# Patient Record
Sex: MALE | Race: WHITE | HISPANIC OR LATINO | URBAN - METROPOLITAN AREA
[De-identification: names, ages, dates, MRNs, and addresses within clinical notes are randomized per-mention and may not be internally consistent; named-entity substitution may affect disease eponyms.]

---

## 2017-06-17 ENCOUNTER — EMERGENCY (EMERGENCY)
Facility: HOSPITAL | Age: 37
LOS: 0 days | Discharge: HOME | End: 2017-06-17
Attending: EMERGENCY MEDICINE | Admitting: EMERGENCY MEDICINE
Payer: COMMERCIAL

## 2017-06-17 VITALS
HEART RATE: 66 BPM | TEMPERATURE: 98 F | OXYGEN SATURATION: 96 % | DIASTOLIC BLOOD PRESSURE: 94 MMHG | SYSTOLIC BLOOD PRESSURE: 130 MMHG | RESPIRATION RATE: 19 BRPM

## 2017-06-17 VITALS — WEIGHT: 160.06 LBS | HEIGHT: 65 IN

## 2017-06-17 DIAGNOSIS — R06.02 SHORTNESS OF BREATH: ICD-10-CM

## 2017-06-17 DIAGNOSIS — J45.909 UNSPECIFIED ASTHMA, UNCOMPLICATED: ICD-10-CM

## 2017-06-17 LAB
ALBUMIN SERPL ELPH-MCNC: 4.3 G/DL — SIGNIFICANT CHANGE UP (ref 3.3–5)
ALP SERPL-CCNC: 102 U/L — SIGNIFICANT CHANGE UP (ref 40–120)
ALT FLD-CCNC: 31 U/L — SIGNIFICANT CHANGE UP (ref 12–78)
ANION GAP SERPL CALC-SCNC: 6 MMOL/L — SIGNIFICANT CHANGE UP (ref 5–17)
APTT BLD: 31.9 SEC — SIGNIFICANT CHANGE UP (ref 27.5–37.4)
AST SERPL-CCNC: 25 U/L — SIGNIFICANT CHANGE UP (ref 15–37)
BASOPHILS # BLD AUTO: 0.1 K/UL — SIGNIFICANT CHANGE UP (ref 0–0.2)
BASOPHILS NFR BLD AUTO: 0.6 % — SIGNIFICANT CHANGE UP (ref 0–2)
BILIRUB SERPL-MCNC: 0.4 MG/DL — SIGNIFICANT CHANGE UP (ref 0.2–1.2)
BUN SERPL-MCNC: 15 MG/DL — SIGNIFICANT CHANGE UP (ref 7–23)
CALCIUM SERPL-MCNC: 9.8 MG/DL — SIGNIFICANT CHANGE UP (ref 8.5–10.1)
CHLORIDE SERPL-SCNC: 106 MMOL/L — SIGNIFICANT CHANGE UP (ref 96–108)
CO2 SERPL-SCNC: 28 MMOL/L — SIGNIFICANT CHANGE UP (ref 22–31)
CREAT SERPL-MCNC: 0.96 MG/DL — SIGNIFICANT CHANGE UP (ref 0.5–1.3)
D DIMER BLD IA.RAPID-MCNC: 190 NG/ML DDU — SIGNIFICANT CHANGE UP
EOSINOPHIL # BLD AUTO: 0.4 K/UL — SIGNIFICANT CHANGE UP (ref 0–0.5)
EOSINOPHIL NFR BLD AUTO: 3.2 % — SIGNIFICANT CHANGE UP (ref 0–6)
GLUCOSE SERPL-MCNC: 97 MG/DL — SIGNIFICANT CHANGE UP (ref 70–99)
HCT VFR BLD CALC: 48.2 % — SIGNIFICANT CHANGE UP (ref 39–50)
HGB BLD-MCNC: 16.3 G/DL — SIGNIFICANT CHANGE UP (ref 13–17)
INR BLD: 1.03 RATIO — SIGNIFICANT CHANGE UP (ref 0.88–1.16)
LYMPHOCYTES # BLD AUTO: 1.4 K/UL — SIGNIFICANT CHANGE UP (ref 1–3.3)
LYMPHOCYTES # BLD AUTO: 11.3 % — LOW (ref 13–44)
MAGNESIUM SERPL-MCNC: 2.2 MG/DL — SIGNIFICANT CHANGE UP (ref 1.6–2.6)
MCHC RBC-ENTMCNC: 29.3 PG — SIGNIFICANT CHANGE UP (ref 27–34)
MCHC RBC-ENTMCNC: 33.8 GM/DL — SIGNIFICANT CHANGE UP (ref 32–36)
MCV RBC AUTO: 86.6 FL — SIGNIFICANT CHANGE UP (ref 80–100)
MONOCYTES # BLD AUTO: 0.6 K/UL — SIGNIFICANT CHANGE UP (ref 0–0.9)
MONOCYTES NFR BLD AUTO: 5.1 % — SIGNIFICANT CHANGE UP (ref 2–14)
NEUTROPHILS # BLD AUTO: 9.9 K/UL — HIGH (ref 1.8–7.4)
NEUTROPHILS NFR BLD AUTO: 79.8 % — HIGH (ref 43–77)
NT-PROBNP SERPL-SCNC: 16 PG/ML — SIGNIFICANT CHANGE UP (ref 0–125)
PLATELET # BLD AUTO: 234 K/UL — SIGNIFICANT CHANGE UP (ref 150–400)
POTASSIUM SERPL-MCNC: 4.3 MMOL/L — SIGNIFICANT CHANGE UP (ref 3.5–5.3)
POTASSIUM SERPL-SCNC: 4.3 MMOL/L — SIGNIFICANT CHANGE UP (ref 3.5–5.3)
PROT SERPL-MCNC: 8 GM/DL — SIGNIFICANT CHANGE UP (ref 6–8.3)
PROTHROM AB SERPL-ACNC: 11.1 SEC — SIGNIFICANT CHANGE UP (ref 9.8–12.7)
RBC # BLD: 5.57 M/UL — SIGNIFICANT CHANGE UP (ref 4.2–5.8)
RBC # FLD: 11.6 % — SIGNIFICANT CHANGE UP (ref 10.3–14.5)
SODIUM SERPL-SCNC: 140 MMOL/L — SIGNIFICANT CHANGE UP (ref 135–145)
TROPONIN I SERPL-MCNC: <0.015 NG/ML — SIGNIFICANT CHANGE UP (ref 0.01–0.04)
TSH SERPL-MCNC: 3.8 UU/ML — HIGH (ref 0.36–3.74)
WBC # BLD: 12.4 K/UL — HIGH (ref 3.8–10.5)
WBC # FLD AUTO: 12.4 K/UL — HIGH (ref 3.8–10.5)

## 2017-06-17 PROCEDURE — 93010 ELECTROCARDIOGRAM REPORT: CPT

## 2017-06-17 PROCEDURE — 71020: CPT | Mod: 26

## 2017-06-17 PROCEDURE — 99285 EMERGENCY DEPT VISIT HI MDM: CPT

## 2017-06-17 RX ORDER — SODIUM CHLORIDE 9 MG/ML
1000 INJECTION INTRAMUSCULAR; INTRAVENOUS; SUBCUTANEOUS ONCE
Qty: 0 | Refills: 0 | Status: COMPLETED | OUTPATIENT
Start: 2017-06-17 | End: 2017-06-17

## 2017-06-17 RX ORDER — SODIUM CHLORIDE 9 MG/ML
3 INJECTION INTRAMUSCULAR; INTRAVENOUS; SUBCUTANEOUS ONCE
Qty: 0 | Refills: 0 | Status: COMPLETED | OUTPATIENT
Start: 2017-06-17 | End: 2017-06-17

## 2017-06-17 RX ORDER — ALBUTEROL 90 UG/1
1 AEROSOL, METERED ORAL ONCE
Qty: 0 | Refills: 0 | Status: DISCONTINUED | OUTPATIENT
Start: 2017-06-17 | End: 2017-06-17

## 2017-06-17 RX ORDER — ALBUTEROL 90 UG/1
2 AEROSOL, METERED ORAL
Qty: 1 | Refills: 0 | OUTPATIENT
Start: 2017-06-17

## 2017-06-17 RX ORDER — IPRATROPIUM/ALBUTEROL SULFATE 18-103MCG
3 AEROSOL WITH ADAPTER (GRAM) INHALATION ONCE
Qty: 0 | Refills: 0 | Status: COMPLETED | OUTPATIENT
Start: 2017-06-17 | End: 2017-06-17

## 2017-06-17 RX ADMIN — Medication 125 MILLIGRAM(S): at 09:30

## 2017-06-17 RX ADMIN — SODIUM CHLORIDE 3 MILLILITER(S): 9 INJECTION INTRAMUSCULAR; INTRAVENOUS; SUBCUTANEOUS at 09:30

## 2017-06-17 RX ADMIN — SODIUM CHLORIDE 1000 MILLILITER(S): 9 INJECTION INTRAMUSCULAR; INTRAVENOUS; SUBCUTANEOUS at 09:30

## 2017-06-17 RX ADMIN — Medication 3 MILLILITER(S): at 09:30

## 2017-06-17 NOTE — ED PROVIDER NOTE - CHPI ED SYMPTOMS NEG
no hemoptysis/no diaphoresis/no chills/no body aches/no cough/no fever/no headache/no edema/no chest pain/no wheezing

## 2017-06-17 NOTE — ED PROVIDER NOTE - PROGRESS NOTE DETAILS
pt sob improved s/p nebs   pt advised to better use mask while working   Return to the ER immediately for any worsening symptoms, concerns, chest pain, fevers, shortness of breath, vomiting, abdominal pain, rashes, neck pain, back pain, numbness, paresthesias, pain or any difficulties at all.  Please follow up with your own private physician or our medical clinic at [phone] in the next 2-3 days.  Find a doctor at [phone].  Copies of your tests were provided to you for follow-up.  You must address all your findings with your doctor.

## 2018-03-12 ENCOUNTER — APPOINTMENT (OUTPATIENT)
Dept: OPHTHALMOLOGY | Facility: CLINIC | Age: 38
End: 2018-03-12
Payer: COMMERCIAL

## 2018-03-12 PROCEDURE — 92002 INTRM OPH EXAM NEW PATIENT: CPT

## 2018-03-19 ENCOUNTER — APPOINTMENT (OUTPATIENT)
Dept: OPHTHALMOLOGY | Facility: CLINIC | Age: 38
End: 2018-03-19
Payer: COMMERCIAL

## 2018-03-19 PROCEDURE — 92012 INTRM OPH EXAM EST PATIENT: CPT

## 2018-03-22 ENCOUNTER — RX RENEWAL (OUTPATIENT)
Age: 38
End: 2018-03-22

## 2018-04-02 ENCOUNTER — APPOINTMENT (OUTPATIENT)
Dept: OPHTHALMOLOGY | Facility: CLINIC | Age: 38
End: 2018-04-02
Payer: COMMERCIAL

## 2018-04-02 PROCEDURE — 92014 COMPRE OPH EXAM EST PT 1/>: CPT

## 2018-06-18 ENCOUNTER — APPOINTMENT (OUTPATIENT)
Dept: RADIOLOGY | Facility: CLINIC | Age: 38
End: 2018-06-18
Payer: COMMERCIAL

## 2018-06-18 ENCOUNTER — OUTPATIENT (OUTPATIENT)
Dept: OUTPATIENT SERVICES | Facility: HOSPITAL | Age: 38
LOS: 1 days | End: 2018-06-18
Payer: COMMERCIAL

## 2018-06-18 DIAGNOSIS — Z00.8 ENCOUNTER FOR OTHER GENERAL EXAMINATION: ICD-10-CM

## 2018-06-18 PROCEDURE — 71046 X-RAY EXAM CHEST 2 VIEWS: CPT

## 2018-06-18 PROCEDURE — 71046 X-RAY EXAM CHEST 2 VIEWS: CPT | Mod: 26

## 2018-08-02 ENCOUNTER — APPOINTMENT (OUTPATIENT)
Dept: INTERNAL MEDICINE | Facility: CLINIC | Age: 38
End: 2018-08-02

## 2018-08-08 ENCOUNTER — EMERGENCY (EMERGENCY)
Facility: HOSPITAL | Age: 38
LOS: 0 days | Discharge: HOME | End: 2018-08-08
Attending: EMERGENCY MEDICINE | Admitting: EMERGENCY MEDICINE
Payer: COMMERCIAL

## 2018-08-08 VITALS
HEIGHT: 65 IN | HEART RATE: 76 BPM | WEIGHT: 160.06 LBS | TEMPERATURE: 98 F | OXYGEN SATURATION: 100 % | SYSTOLIC BLOOD PRESSURE: 135 MMHG | DIASTOLIC BLOOD PRESSURE: 80 MMHG | RESPIRATION RATE: 18 BRPM

## 2018-08-08 DIAGNOSIS — R06.02 SHORTNESS OF BREATH: ICD-10-CM

## 2018-08-08 DIAGNOSIS — Z79.51 LONG TERM (CURRENT) USE OF INHALED STEROIDS: ICD-10-CM

## 2018-08-08 DIAGNOSIS — R53.1 WEAKNESS: ICD-10-CM

## 2018-08-08 LAB
ALBUMIN SERPL ELPH-MCNC: 4.2 G/DL — SIGNIFICANT CHANGE UP (ref 3.3–5)
ALP SERPL-CCNC: 95 U/L — SIGNIFICANT CHANGE UP (ref 40–120)
ALT FLD-CCNC: 30 U/L — SIGNIFICANT CHANGE UP (ref 12–78)
ANION GAP SERPL CALC-SCNC: 6 MMOL/L — SIGNIFICANT CHANGE UP (ref 5–17)
AST SERPL-CCNC: 19 U/L — SIGNIFICANT CHANGE UP (ref 15–37)
BASOPHILS # BLD AUTO: 0.04 K/UL — SIGNIFICANT CHANGE UP (ref 0–0.2)
BASOPHILS NFR BLD AUTO: 0.4 % — SIGNIFICANT CHANGE UP (ref 0–2)
BILIRUB SERPL-MCNC: 0.2 MG/DL — SIGNIFICANT CHANGE UP (ref 0.2–1.2)
BUN SERPL-MCNC: 16 MG/DL — SIGNIFICANT CHANGE UP (ref 7–23)
CALCIUM SERPL-MCNC: 9.3 MG/DL — SIGNIFICANT CHANGE UP (ref 8.5–10.1)
CHLORIDE SERPL-SCNC: 107 MMOL/L — SIGNIFICANT CHANGE UP (ref 96–108)
CO2 SERPL-SCNC: 27 MMOL/L — SIGNIFICANT CHANGE UP (ref 22–31)
CREAT SERPL-MCNC: 1.03 MG/DL — SIGNIFICANT CHANGE UP (ref 0.5–1.3)
EOSINOPHIL # BLD AUTO: 0.51 K/UL — HIGH (ref 0–0.5)
EOSINOPHIL NFR BLD AUTO: 5.5 % — SIGNIFICANT CHANGE UP (ref 0–6)
GLUCOSE SERPL-MCNC: 103 MG/DL — HIGH (ref 70–99)
HCT VFR BLD CALC: 44.5 % — SIGNIFICANT CHANGE UP (ref 39–50)
HGB BLD-MCNC: 15.3 G/DL — SIGNIFICANT CHANGE UP (ref 13–17)
IMM GRANULOCYTES NFR BLD AUTO: 0.8 % — SIGNIFICANT CHANGE UP (ref 0–1.5)
LYMPHOCYTES # BLD AUTO: 1.38 K/UL — SIGNIFICANT CHANGE UP (ref 1–3.3)
LYMPHOCYTES # BLD AUTO: 15 % — SIGNIFICANT CHANGE UP (ref 13–44)
MCHC RBC-ENTMCNC: 29.8 PG — SIGNIFICANT CHANGE UP (ref 27–34)
MCHC RBC-ENTMCNC: 34.4 GM/DL — SIGNIFICANT CHANGE UP (ref 32–36)
MCV RBC AUTO: 86.6 FL — SIGNIFICANT CHANGE UP (ref 80–100)
MONOCYTES # BLD AUTO: 0.66 K/UL — SIGNIFICANT CHANGE UP (ref 0–0.9)
MONOCYTES NFR BLD AUTO: 7.2 % — SIGNIFICANT CHANGE UP (ref 2–14)
NEUTROPHILS # BLD AUTO: 6.56 K/UL — SIGNIFICANT CHANGE UP (ref 1.8–7.4)
NEUTROPHILS NFR BLD AUTO: 71.1 % — SIGNIFICANT CHANGE UP (ref 43–77)
NRBC # BLD: 0 /100 WBCS — SIGNIFICANT CHANGE UP (ref 0–0)
NT-PROBNP SERPL-SCNC: 20 PG/ML — SIGNIFICANT CHANGE UP (ref 0–125)
PLATELET # BLD AUTO: 250 K/UL — SIGNIFICANT CHANGE UP (ref 150–400)
POTASSIUM SERPL-MCNC: 4.3 MMOL/L — SIGNIFICANT CHANGE UP (ref 3.5–5.3)
POTASSIUM SERPL-SCNC: 4.3 MMOL/L — SIGNIFICANT CHANGE UP (ref 3.5–5.3)
PROT SERPL-MCNC: 7.7 GM/DL — SIGNIFICANT CHANGE UP (ref 6–8.3)
RBC # BLD: 5.14 M/UL — SIGNIFICANT CHANGE UP (ref 4.2–5.8)
RBC # FLD: 13 % — SIGNIFICANT CHANGE UP (ref 10.3–14.5)
SODIUM SERPL-SCNC: 140 MMOL/L — SIGNIFICANT CHANGE UP (ref 135–145)
TROPONIN I SERPL-MCNC: <0.015 NG/ML — SIGNIFICANT CHANGE UP (ref 0.01–0.04)
WBC # BLD: 9.22 K/UL — SIGNIFICANT CHANGE UP (ref 3.8–10.5)
WBC # FLD AUTO: 9.22 K/UL — SIGNIFICANT CHANGE UP (ref 3.8–10.5)

## 2018-08-08 PROCEDURE — 93010 ELECTROCARDIOGRAM REPORT: CPT

## 2018-08-08 PROCEDURE — 71275 CT ANGIOGRAPHY CHEST: CPT | Mod: 26

## 2018-08-08 PROCEDURE — 99285 EMERGENCY DEPT VISIT HI MDM: CPT

## 2018-08-08 RX ORDER — ALBUTEROL 90 UG/1
2.5 AEROSOL, METERED ORAL ONCE
Qty: 0 | Refills: 0 | Status: COMPLETED | OUTPATIENT
Start: 2018-08-08 | End: 2018-08-08

## 2018-08-08 RX ADMIN — ALBUTEROL 2.5 MILLIGRAM(S): 90 AEROSOL, METERED ORAL at 17:26

## 2018-08-08 NOTE — ED STATDOCS - NEUROLOGICAL, MLM
sensation is normal and strength is normal. sensation is normal and strength is normal. normal gait.

## 2018-08-08 NOTE — ED STATDOCS - PROGRESS NOTE DETAILS
37 yo M with no PMHx presents to ED c/o episodes of SOB last night and today, associated with dizziness and weakness in b/l legs.  Pt used albuterol inhaler last night with symptom relief, used again today w/o relief.  Pt has seen a pulmonologist in the past w/o significant findings.  Pt denies CP, fever/chills, numbness/tingling, recent travel, swelling, or other acute complaints at this time  PE: lungs CTA b/l, RRR s1/s2, radial pulses 2+ b/l, strength and sensation intact b/l UE and LE, EKG NSR at 65bpm  Plan: CT chest, labs, troponin, nebulizer, ativan   Tanna Mares PA-C CT angio chest negative, all labs WNL, troponin negative.  Pt feeling better after nebulizer, plan for d/c home with outpt f/u with pulmonologist.  Pt agreeable to d/c and plan of care, all questions answered, return precautions given.  Tanna Mares PA-C

## 2018-08-08 NOTE — ED STATDOCS - MEDICAL DECISION MAKING DETAILS
Pt presenting with chest tightness, SOB. Sometimes relieved with albuterol. Normal respiratory exam at this time. Will obtain labs, EKG, troponin, CT chest per family request. if normal, will dc.

## 2018-08-08 NOTE — ED ADULT NURSE NOTE - NSIMPLEMENTINTERV_GEN_ALL_ED
Implemented All Universal Safety Interventions:  [place] to call system. Call bell, personal items and telephone within reach. Instruct patient to call for assistance. Room bathroom lighting operational. Non-slip footwear when patient is off stretcher. Physically safe environment: no spills, clutter or unnecessary equipment. Stretcher in lowest position, wheels locked, appropriate side rails in place.

## 2018-08-08 NOTE — ED STATDOCS - OBJECTIVE STATEMENT
37 y/o male with no PMHx presents to the ED c/o episodes of SOB last night, today. +dizziness while driving today. +generalized weakness. +pressure to back with inspiration. Pt notes that he had to pull over upon experiencing sx. Used albuterol inhaler for sx last night with relief. Wife notes that he drank glass of wine yesterday. Works with epoxy coating for work with mask. Pt has seen pulmonologist with negative work-up in the past. Born in [facility], no FHx of TB. Denies pain, tingling, pedal edema, anxiety, recent travel. Nonsmoker. 39 y/o male with no PMHx presents to the ED c/o episodes of SOB last night, today. +dizziness while driving today. +generalized weakness. +pressure to back with inspiration. Pt notes that he had to pull over upon experiencing sx. Used albuterol inhaler for sx last night with relief. Wife notes that he drank glass of wine yesterday. Works with epoxy coating for work with mask. Pt has seen pulmonologist with negative work-up recently. Born in [facility], no FHx of TB. Denies pain, tingling, pedal edema, anxiety, recent travel. Nonsmoker. No drugs. No trouble swallowing.

## 2018-08-09 ENCOUNTER — APPOINTMENT (OUTPATIENT)
Dept: INTERNAL MEDICINE | Facility: CLINIC | Age: 38
End: 2018-08-09
Payer: COMMERCIAL

## 2018-08-09 VITALS
RESPIRATION RATE: 18 BRPM | BODY MASS INDEX: 26.66 KG/M2 | OXYGEN SATURATION: 98 % | WEIGHT: 160 LBS | HEIGHT: 65 IN | DIASTOLIC BLOOD PRESSURE: 58 MMHG | SYSTOLIC BLOOD PRESSURE: 100 MMHG | HEART RATE: 72 BPM | TEMPERATURE: 98.1 F

## 2018-08-09 DIAGNOSIS — Z78.9 OTHER SPECIFIED HEALTH STATUS: ICD-10-CM

## 2018-08-09 PROCEDURE — 94729 DIFFUSING CAPACITY: CPT

## 2018-08-09 PROCEDURE — ZZZZZ: CPT

## 2018-08-09 PROCEDURE — 99205 OFFICE O/P NEW HI 60 MIN: CPT | Mod: 25

## 2018-08-09 PROCEDURE — 94060 EVALUATION OF WHEEZING: CPT

## 2018-08-09 PROCEDURE — 94727 GAS DIL/WSHOT DETER LNG VOL: CPT

## 2018-08-09 RX ORDER — KETOROLAC TROMETHAMINE 4 MG/ML
0.4 SOLUTION/ DROPS OPHTHALMIC 4 TIMES DAILY
Qty: 5 | Refills: 2 | Status: DISCONTINUED | COMMUNITY
Start: 2018-03-19 | End: 2018-08-09

## 2018-08-09 RX ORDER — KETOROLAC TROMETHAMINE 4 MG/ML
0.4 SOLUTION/ DROPS OPHTHALMIC 4 TIMES DAILY
Qty: 5 | Refills: 1 | Status: DISCONTINUED | COMMUNITY
Start: 2018-03-22 | End: 2018-08-09

## 2018-08-09 RX ORDER — OFLOXACIN 3 MG/ML
0.3 SOLUTION/ DROPS OPHTHALMIC 4 TIMES DAILY
Qty: 5 | Refills: 1 | Status: DISCONTINUED | COMMUNITY
Start: 2018-03-12 | End: 2018-08-09

## 2018-08-09 RX ORDER — NEOMYCIN AND POLYMYXIN B SULFATES AND DEXAMETHASONE 3.5; 10000; 1 MG/G; [IU]/G; MG/G
3.5-10000-0.1 OINTMENT OPHTHALMIC
Qty: 1 | Refills: 1 | Status: DISCONTINUED | COMMUNITY
Start: 2018-04-02 | End: 2018-08-09

## 2018-08-09 NOTE — HISTORY OF PRESENT ILLNESS
[Spouse] : spouse [FreeTextEntry8] : Mr. Gomez presents for an acute evaluation accompanied by his wife. He is complaining of intermittent episodes of shortness of breath for the past 2 days. He feels as if he has a tightness in his chest. The patient went to [facility] emergency room yesterday. Cardiac enzymes were negative. Mr. Gomez had a CT angiogram of the chest which was negative for pulmonary embolism. There was no evidence of parenchymal lung disease. The patient denies any history of asthma or seasonal allergic rhinitis. He does have a history of trauma to his nose and has a deviated septum. Mr. Gomez had ENT evaluation by Dr. Maico Cruz. He has been started on steroid nasal spray. Mr. Gomez was seen by another pulmonologist and started on Symbicort 160/4.5 mcg 2 puffs b.i.d. as well as Ventolin inhaler for rescue therapy. Mr. Gomez states he felt worse using Symbicort and discontinued. He does get some relief from the shortness of breath and cough using albuterol.

## 2018-08-09 NOTE — ASSESSMENT
[FreeTextEntry1] : Mr. Gomez is a 38-year-old male is having symptoms of intermittent shortness of breath. Complete pulmonary function tests show no evidence of obstructive lung disease. Patient had a CT of the chest which showed no evidence of pulmonary embolism or interstitial lung disease. He does get some benefit from using albuterol. Patient also does have a deviated septum with that chronic rhinitis. He will be given a course of prednisone 20 mg p.o. b.i.d. x7 days as well as Singulair 10 mg daily. Mr. Gomez will continue followup with Dr. Cruz for ENT. Followup in this office in 4 weeks time to assess response to therapy.

## 2018-08-09 NOTE — DATA REVIEWED
[FreeTextEntry1] : Complete pulmonary function tests show normal spirometry, lung volumes and flow-volume loop. Diffusing capacity is normal.

## 2018-09-02 ENCOUNTER — EMERGENCY (EMERGENCY)
Facility: HOSPITAL | Age: 38
LOS: 1 days | Discharge: HOME | End: 2018-09-02
Attending: EMERGENCY MEDICINE
Payer: COMMERCIAL

## 2018-09-02 VITALS
RESPIRATION RATE: 18 BRPM | OXYGEN SATURATION: 100 % | HEIGHT: 65 IN | WEIGHT: 160.06 LBS | TEMPERATURE: 98 F | DIASTOLIC BLOOD PRESSURE: 60 MMHG | HEART RATE: 78 BPM | SYSTOLIC BLOOD PRESSURE: 96 MMHG

## 2018-09-02 PROCEDURE — 72131 CT LUMBAR SPINE W/O DYE: CPT

## 2018-09-02 PROCEDURE — 72131 CT LUMBAR SPINE W/O DYE: CPT | Mod: 26

## 2018-09-02 PROCEDURE — 99284 EMERGENCY DEPT VISIT MOD MDM: CPT

## 2018-09-02 PROCEDURE — 99284 EMERGENCY DEPT VISIT MOD MDM: CPT | Mod: 25

## 2018-09-02 PROCEDURE — 96372 THER/PROPH/DIAG INJ SC/IM: CPT

## 2018-09-02 RX ORDER — DIAZEPAM 5 MG
5 TABLET ORAL ONCE
Qty: 0 | Refills: 0 | Status: DISCONTINUED | OUTPATIENT
Start: 2018-09-02 | End: 2018-09-02

## 2018-09-02 RX ORDER — CYCLOBENZAPRINE HYDROCHLORIDE 10 MG/1
10 TABLET, FILM COATED ORAL ONCE
Qty: 0 | Refills: 0 | Status: COMPLETED | OUTPATIENT
Start: 2018-09-02 | End: 2018-09-02

## 2018-09-02 RX ORDER — METHOCARBAMOL 500 MG/1
2 TABLET, FILM COATED ORAL
Qty: 30 | Refills: 0 | OUTPATIENT
Start: 2018-09-02 | End: 2018-09-06

## 2018-09-02 RX ORDER — LIDOCAINE 4 G/100G
1 CREAM TOPICAL ONCE
Qty: 0 | Refills: 0 | Status: COMPLETED | OUTPATIENT
Start: 2018-09-02 | End: 2018-09-02

## 2018-09-02 RX ORDER — TRAMADOL HYDROCHLORIDE 50 MG/1
50 TABLET ORAL ONCE
Qty: 0 | Refills: 0 | Status: DISCONTINUED | OUTPATIENT
Start: 2018-09-02 | End: 2018-09-02

## 2018-09-02 RX ORDER — KETOROLAC TROMETHAMINE 30 MG/ML
60 SYRINGE (ML) INJECTION ONCE
Qty: 0 | Refills: 0 | Status: DISCONTINUED | OUTPATIENT
Start: 2018-09-02 | End: 2018-09-02

## 2018-09-02 RX ADMIN — LIDOCAINE 1 PATCH: 4 CREAM TOPICAL at 15:24

## 2018-09-02 RX ADMIN — Medication 60 MILLIGRAM(S): at 16:56

## 2018-09-02 RX ADMIN — CYCLOBENZAPRINE HYDROCHLORIDE 10 MILLIGRAM(S): 10 TABLET, FILM COATED ORAL at 15:25

## 2018-09-02 RX ADMIN — TRAMADOL HYDROCHLORIDE 50 MILLIGRAM(S): 50 TABLET ORAL at 15:24

## 2018-09-02 RX ADMIN — Medication 60 MILLIGRAM(S): at 16:54

## 2018-09-02 RX ADMIN — Medication 50 MILLIGRAM(S): at 15:24

## 2018-09-02 RX ADMIN — Medication 5 MILLIGRAM(S): at 16:54

## 2018-09-02 NOTE — ED ADULT TRIAGE NOTE - CHIEF COMPLAINT QUOTE
Pt presents to ED for eval of sudden onset of mid to lower back pain sustained while moving large pavers. Pt states that he felt a loud "crack" in his back. No radiation of pain. Pt took 1000 mgs of Naproxen. No decrease in sensation. Limited ROM secondary to pain.

## 2018-09-02 NOTE — ED ADULT NURSE NOTE - OBJECTIVE STATEMENT
pt arrived with lower back pain, 10/10 after lifting heavy pavers, pt states felt a pop, pt with swelling to lower back

## 2018-09-02 NOTE — ED ADULT NURSE NOTE - NSIMPLEMENTINTERV_GEN_ALL_ED
Implemented All Universal Safety Interventions:  [place] to call system. Call bell, personal items and telephone within reach. Instruct patient to call for assistance. Room bathroom lighting operational. Non-slip footwear when patient is off stretcher. Physically safe environment: no spills, clutter or unnecessary equipment. Stretcher in lowest position, wheels locked, appropriate side rails in place.

## 2018-09-02 NOTE — ED STATDOCS - MUSCULOSKELETAL, MLM
range of motion limited secondary to pain and has lumbar tenderness. LE muscle strength 5/5. No sedan anesthesia. range of motion limited secondary to pain and has lumbar tenderness. LE muscle strength 5/5. No saddle  paresthesia.

## 2018-09-02 NOTE — ED STATDOCS - OBJECTIVE STATEMENT
39 y/o M pt with no pertinent medical hx presents to ED c/o back pain. He states that his back pain travels from his mid back to the lower part of his back. He reports that he sustained this pain while moving large pavers (rocks). Pt states that he heard a loud "crack" in his back, and almost synopsized. Pt took 1000 mg of Naproxen. No further complaints at this time.

## 2018-09-02 NOTE — ED STATDOCS - ATTENDING CONTRIBUTION TO CARE
I, Deuce Khalil, performed the initial face to face bedside interview with this patient regarding history of present illness, review of symptoms and relevant past medical, social and family history.  I completed an independent physical examination.  I was the initial provider who evaluated this patient. I have signed out the follow up of any pending tests (i.e. labs, radiological studies) to the ACP.  I have communicated the patient’s plan of care and disposition with the ACP.

## 2018-09-07 ENCOUNTER — APPOINTMENT (OUTPATIENT)
Dept: INTERNAL MEDICINE | Facility: CLINIC | Age: 38
End: 2018-09-07
Payer: COMMERCIAL

## 2018-09-07 VITALS
RESPIRATION RATE: 18 BRPM | SYSTOLIC BLOOD PRESSURE: 100 MMHG | TEMPERATURE: 98.4 F | OXYGEN SATURATION: 98 % | HEART RATE: 64 BPM | BODY MASS INDEX: 26.66 KG/M2 | HEIGHT: 65 IN | WEIGHT: 160 LBS | DIASTOLIC BLOOD PRESSURE: 82 MMHG

## 2018-09-07 PROCEDURE — 99214 OFFICE O/P EST MOD 30 MIN: CPT

## 2019-04-11 ENCOUNTER — APPOINTMENT (OUTPATIENT)
Dept: RADIOLOGY | Facility: CLINIC | Age: 39
End: 2019-04-11
Payer: COMMERCIAL

## 2019-04-11 ENCOUNTER — OUTPATIENT (OUTPATIENT)
Dept: OUTPATIENT SERVICES | Facility: HOSPITAL | Age: 39
LOS: 1 days | End: 2019-04-11
Payer: COMMERCIAL

## 2019-04-11 DIAGNOSIS — Z00.8 ENCOUNTER FOR OTHER GENERAL EXAMINATION: ICD-10-CM

## 2019-04-11 PROCEDURE — 71046 X-RAY EXAM CHEST 2 VIEWS: CPT

## 2019-04-11 PROCEDURE — 71046 X-RAY EXAM CHEST 2 VIEWS: CPT | Mod: 26

## 2019-05-21 NOTE — ED PROVIDER NOTE - OBJECTIVE STATEMENT
Satisfactory
37 yo M with no prior medical hx who works with [employer] and exposed to dust and epoxid presents with sob. denies HA, cp, abdopain, n/v/d or dysuria. pt complains of dizziness.

## 2020-12-23 ENCOUNTER — NON-APPOINTMENT (OUTPATIENT)
Age: 40
End: 2020-12-23

## 2020-12-23 ENCOUNTER — APPOINTMENT (OUTPATIENT)
Dept: INTERNAL MEDICINE | Facility: CLINIC | Age: 40
End: 2020-12-23
Payer: COMMERCIAL

## 2020-12-23 VITALS
RESPIRATION RATE: 16 BRPM | HEART RATE: 70 BPM | BODY MASS INDEX: 26.33 KG/M2 | OXYGEN SATURATION: 98 % | TEMPERATURE: 98.3 F | DIASTOLIC BLOOD PRESSURE: 82 MMHG | SYSTOLIC BLOOD PRESSURE: 126 MMHG | WEIGHT: 158 LBS | HEIGHT: 65 IN

## 2020-12-23 DIAGNOSIS — H01.006 UNSPECIFIED BLEPHARITIS RIGHT EYE, UNSPECIFIED EYELID: ICD-10-CM

## 2020-12-23 DIAGNOSIS — Z87.898 PERSONAL HISTORY OF OTHER SPECIFIED CONDITIONS: ICD-10-CM

## 2020-12-23 DIAGNOSIS — Z77.098 CONTACT WITH AND (SUSPECTED) EXPOSURE TO OTHER HAZARDOUS, CHIEFLY NONMEDICINAL, CHEMICALS: ICD-10-CM

## 2020-12-23 DIAGNOSIS — Z87.09 PERSONAL HISTORY OF OTHER DISEASES OF THE RESPIRATORY SYSTEM: ICD-10-CM

## 2020-12-23 DIAGNOSIS — R05 COUGH: ICD-10-CM

## 2020-12-23 DIAGNOSIS — H01.003 UNSPECIFIED BLEPHARITIS RIGHT EYE, UNSPECIFIED EYELID: ICD-10-CM

## 2020-12-23 DIAGNOSIS — H15.101 UNSPECIFIED EPISCLERITIS, RIGHT EYE: ICD-10-CM

## 2020-12-23 DIAGNOSIS — H10.31 UNSPECIFIED ACUTE CONJUNCTIVITIS, RIGHT EYE: ICD-10-CM

## 2020-12-23 PROCEDURE — 99386 PREV VISIT NEW AGE 40-64: CPT | Mod: 25

## 2020-12-23 PROCEDURE — 99213 OFFICE O/P EST LOW 20 MIN: CPT | Mod: 25

## 2020-12-23 PROCEDURE — 93000 ELECTROCARDIOGRAM COMPLETE: CPT

## 2020-12-23 PROCEDURE — 96127 BRIEF EMOTIONAL/BEHAV ASSMT: CPT

## 2020-12-23 PROCEDURE — 99072 ADDL SUPL MATRL&STAF TM PHE: CPT

## 2020-12-23 RX ORDER — ALBUTEROL SULFATE 90 UG/1
108 (90 BASE) AEROSOL, METERED RESPIRATORY (INHALATION)
Refills: 0 | Status: DISCONTINUED | COMMUNITY
End: 2020-12-23

## 2020-12-23 RX ORDER — PREDNISONE 20 MG/1
20 TABLET ORAL TWICE DAILY
Qty: 14 | Refills: 1 | Status: DISCONTINUED | COMMUNITY
Start: 2018-08-09 | End: 2020-12-23

## 2020-12-23 RX ORDER — BUDESONIDE AND FORMOTEROL FUMARATE DIHYDRATE 160; 4.5 UG/1; UG/1
160-4.5 AEROSOL RESPIRATORY (INHALATION)
Refills: 0 | Status: DISCONTINUED | COMMUNITY
End: 2020-12-23

## 2020-12-23 RX ORDER — MONTELUKAST 10 MG/1
10 TABLET, FILM COATED ORAL
Qty: 30 | Refills: 5 | Status: DISCONTINUED | COMMUNITY
Start: 2018-08-09 | End: 2020-12-23

## 2020-12-27 NOTE — HISTORY OF PRESENT ILLNESS
[de-identified] : Here for CPE and to establish care\par \par He states he feels well\par \par He states he works with a lot of dusts at work which he inhales and would like to have a CXR done\par \par He states when he eats sweet things he does not feel well.  he states sugary foods give him a headache.  he wants to be checked for diabetes\par \par He reports he has a few years ago he lifted something heavy and has had some mild intermittent chronic low back pain since then.  he states this did not occur at work.  He states he was told in past he had a "disc problem".  HE denies leg weakness or paraesthesias.  NO weight loss.  NO urinary complaints.  He prefers to take no meds and is not taking anything for his sx

## 2020-12-27 NOTE — REVIEW OF SYSTEMS
[Negative] : Eyes [Fever] : no fever [Chills] : no chills [Fatigue] : no fatigue [Recent Change In Weight] : ~T no recent weight change [Earache] : no earache [Nasal Discharge] : no nasal discharge [Sore Throat] : no sore throat [Chest Pain] : no chest pain [Palpitations] : no palpitations [Lower Ext Edema] : no lower extremity edema [Shortness Of Breath] : no shortness of breath [Wheezing] : no wheezing [Cough] : no cough [Dyspnea on Exertion] : not dyspnea on exertion [Abdominal Pain] : no abdominal pain [Nausea] : no nausea [Diarrhea] : no diarrhea [Vomiting] : no vomiting [Skin Rash] : no skin rash [Anxiety] : no anxiety [Depression] : no depression [FreeTextEntry9] : see HPI

## 2020-12-27 NOTE — ASSESSMENT
[FreeTextEntry1] : \par \par Exposure to dusts at work:\par -no sx reported\par -will check CXR\par \par Low back pain:\par -he prefers no meds\par -will check xray of L-spine\par -[facility]\par \par Intolerance to sugary foods:\par -I advised he avoid foods high in sugar and foods that exacerbate sx\par -will check labs\par \par HCM:\par \par CPE 2020\par \par Depression screenin2020 PHQ 2 score 0\par \par EK2020 NSR at 73, IRBBB, no ST abnormalities \par \par Flu shot: advised 2020-he declined\par \par Tdap: advised-he states he will think about it-VIS given\par \par HIV testing: offered 2020-he declined\par \par Hepatitis C screening: offered \par \par PSA: ordered today\par \par F/U 6 months\par

## 2020-12-27 NOTE — PHYSICAL EXAM
[No Acute Distress] : no acute distress [Well Nourished] : well nourished [Well Developed] : well developed [Well-Appearing] : well-appearing [Normal Sclera/Conjunctiva] : normal sclera/conjunctiva [PERRL] : pupils equal round and reactive to light [EOMI] : extraocular movements intact [Normal Outer Ear/Nose] : the outer ears and nose were normal in appearance [Normal Oropharynx] : the oropharynx was normal [No JVD] : no jugular venous distention [No Lymphadenopathy] : no lymphadenopathy [Supple] : supple [Thyroid Normal, No Nodules] : the thyroid was normal and there were no nodules present [No Respiratory Distress] : no respiratory distress  [No Accessory Muscle Use] : no accessory muscle use [Clear to Auscultation] : lungs were clear to auscultation bilaterally [Normal Rate] : normal rate  [Regular Rhythm] : with a regular rhythm [Normal S1, S2] : normal S1 and S2 [No Murmur] : no murmur heard [No Carotid Bruits] : no carotid bruits [No Edema] : there was no peripheral edema [No Extremity Clubbing/Cyanosis] : no extremity clubbing/cyanosis [Soft] : abdomen soft [Non Tender] : non-tender [Non-distended] : non-distended [No Masses] : no abdominal mass palpated [No HSM] : no HSM [Normal Bowel Sounds] : normal bowel sounds [No CVA Tenderness] : no CVA  tenderness [No Spinal Tenderness] : no spinal tenderness [No Joint Swelling] : no joint swelling [Grossly Normal Strength/Tone] : grossly normal strength/tone [No Rash] : no rash [No Focal Deficits] : no focal deficits [Normal Affect] : the affect was normal [Normal Insight/Judgement] : insight and judgment were intact [Normal Voice/Communication] : normal voice/communication [Normal TMs] : both tympanic membranes were normal [Normal Nasal Mucosa] : the nasal mucosa was normal [No Skin Lesions] : no skin lesions [Alert and Oriented x3] : oriented to person, place, and time [Normal Mood] : the mood was normal [de-identified] : negative SLR B/L

## 2020-12-27 NOTE — HEALTH RISK ASSESSMENT
[No] : In the past 12 months have you used drugs other than those required for medical reasons? 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

## 2021-01-07 LAB
25(OH)D3 SERPL-MCNC: 24.1 NG/ML
ALBUMIN SERPL ELPH-MCNC: 4.7 G/DL
ALP BLD-CCNC: 95 U/L
ALT SERPL-CCNC: 18 U/L
ANION GAP SERPL CALC-SCNC: 11 MMOL/L
APPEARANCE: CLEAR
AST SERPL-CCNC: 19 U/L
BACTERIA: NEGATIVE
BASOPHILS # BLD AUTO: 0.06 K/UL
BASOPHILS NFR BLD AUTO: 0.8 %
BILIRUB SERPL-MCNC: 0.3 MG/DL
BILIRUBIN URINE: NEGATIVE
BLOOD URINE: NEGATIVE
BUN SERPL-MCNC: 14 MG/DL
CALCIUM SERPL-MCNC: 9.6 MG/DL
CHLORIDE SERPL-SCNC: 103 MMOL/L
CHOLEST SERPL-MCNC: 209 MG/DL
CO2 SERPL-SCNC: 27 MMOL/L
COLOR: NORMAL
CREAT SERPL-MCNC: 0.93 MG/DL
EOSINOPHIL # BLD AUTO: 0.86 K/UL
EOSINOPHIL NFR BLD AUTO: 10.8 %
ERYTHROCYTE [SEDIMENTATION RATE] IN BLOOD BY WESTERGREN METHOD: 6 MM/HR
ESTIMATED AVERAGE GLUCOSE: 114 MG/DL
GLUCOSE QUALITATIVE U: NEGATIVE
GLUCOSE SERPL-MCNC: 86 MG/DL
HBA1C MFR BLD HPLC: 5.6 %
HCT VFR BLD CALC: 47.5 %
HDLC SERPL-MCNC: 42 MG/DL
HGB BLD-MCNC: 15.3 G/DL
HYALINE CASTS: 0 /LPF
IMM GRANULOCYTES NFR BLD AUTO: 0.4 %
KETONES URINE: NEGATIVE
LDLC SERPL CALC-MCNC: 135 MG/DL
LEUKOCYTE ESTERASE URINE: NEGATIVE
LYMPHOCYTES # BLD AUTO: 2.34 K/UL
LYMPHOCYTES NFR BLD AUTO: 29.5 %
MAN DIFF?: NORMAL
MCHC RBC-ENTMCNC: 28.7 PG
MCHC RBC-ENTMCNC: 32.2 GM/DL
MCV RBC AUTO: 89.1 FL
MICROSCOPIC-UA: NORMAL
MONOCYTES # BLD AUTO: 0.64 K/UL
MONOCYTES NFR BLD AUTO: 8.1 %
NEUTROPHILS # BLD AUTO: 4 K/UL
NEUTROPHILS NFR BLD AUTO: 50.4 %
NITRITE URINE: NEGATIVE
NONHDLC SERPL-MCNC: 167 MG/DL
PH URINE: 6
PLATELET # BLD AUTO: 242 K/UL
POTASSIUM SERPL-SCNC: 4.4 MMOL/L
PROT SERPL-MCNC: 6.9 G/DL
PROTEIN URINE: NEGATIVE
PSA SERPL-MCNC: 0.61 NG/ML
RBC # BLD: 5.33 M/UL
RBC # FLD: 12.8 %
RED BLOOD CELLS URINE: 2 /HPF
SODIUM SERPL-SCNC: 141 MMOL/L
SPECIFIC GRAVITY URINE: 1.02
SQUAMOUS EPITHELIAL CELLS: 0 /HPF
T4 FREE SERPL-MCNC: 1 NG/DL
TRIGL SERPL-MCNC: 158 MG/DL
TSH SERPL-ACNC: 3.04 UIU/ML
UROBILINOGEN URINE: NORMAL
WBC # FLD AUTO: 7.93 K/UL
WHITE BLOOD CELLS URINE: 0 /HPF

## 2021-01-12 ENCOUNTER — NON-APPOINTMENT (OUTPATIENT)
Age: 41
End: 2021-01-12

## 2021-06-29 ENCOUNTER — APPOINTMENT (OUTPATIENT)
Dept: INTERNAL MEDICINE | Facility: CLINIC | Age: 41
End: 2021-06-29
Payer: COMMERCIAL

## 2021-06-29 VITALS
OXYGEN SATURATION: 95 % | DIASTOLIC BLOOD PRESSURE: 80 MMHG | HEIGHT: 65 IN | BODY MASS INDEX: 25.83 KG/M2 | SYSTOLIC BLOOD PRESSURE: 122 MMHG | RESPIRATION RATE: 15 BRPM | TEMPERATURE: 98.7 F | HEART RATE: 70 BPM | WEIGHT: 155 LBS

## 2021-06-29 DIAGNOSIS — K90.49 MALABSORPTION DUE TO INTOLERANCE, NOT ELSEWHERE CLASSIFIED: ICD-10-CM

## 2021-06-29 DIAGNOSIS — M54.5 LOW BACK PAIN: ICD-10-CM

## 2021-06-29 DIAGNOSIS — Z23 ENCOUNTER FOR IMMUNIZATION: ICD-10-CM

## 2021-06-29 PROCEDURE — 96127 BRIEF EMOTIONAL/BEHAV ASSMT: CPT

## 2021-06-29 PROCEDURE — 90471 IMMUNIZATION ADMIN: CPT

## 2021-06-29 PROCEDURE — 90715 TDAP VACCINE 7 YRS/> IM: CPT

## 2021-06-29 PROCEDURE — 36415 COLL VENOUS BLD VENIPUNCTURE: CPT

## 2021-06-29 PROCEDURE — 99213 OFFICE O/P EST LOW 20 MIN: CPT | Mod: 25

## 2021-06-29 PROCEDURE — 99072 ADDL SUPL MATRL&STAF TM PHE: CPT

## 2021-06-29 NOTE — PHYSICAL EXAM
[Well Nourished] : well nourished [Well Developed] : well developed [Well-Appearing] : well-appearing [Normal Voice/Communication] : normal voice/communication [Normal Sclera/Conjunctiva] : normal sclera/conjunctiva [PERRL] : pupils equal round and reactive to light [Normal Oropharynx] : the oropharynx was normal [No JVD] : no jugular venous distention [No Lymphadenopathy] : no lymphadenopathy [Supple] : supple [Thyroid Normal, No Nodules] : the thyroid was normal and there were no nodules present [No Respiratory Distress] : no respiratory distress  [No Accessory Muscle Use] : no accessory muscle use [Clear to Auscultation] : lungs were clear to auscultation bilaterally [Normal Rate] : normal rate  [Regular Rhythm] : with a regular rhythm [Normal S1, S2] : normal S1 and S2 [No Murmur] : no murmur heard [No Edema] : there was no peripheral edema [No Extremity Clubbing/Cyanosis] : no extremity clubbing/cyanosis [Soft] : abdomen soft [Non Tender] : non-tender [Non-distended] : non-distended [No Masses] : no abdominal mass palpated [No HSM] : no HSM [Normal Bowel Sounds] : normal bowel sounds [No Rash] : no rash [No Focal Deficits] : no focal deficits [Normal Affect] : the affect was normal [Alert and Oriented x3] : oriented to person, place, and time [Normal Mood] : the mood was normal [Normal Insight/Judgement] : insight and judgment were intact [No Acute Distress] : no acute distress

## 2021-07-04 PROBLEM — K90.49 FOOD INTOLERANCE IN ADULT: Status: ACTIVE | Noted: 2020-12-27

## 2021-07-04 PROBLEM — M54.5 LOW BACK PAIN: Status: ACTIVE | Noted: 2020-12-23

## 2021-07-04 LAB
25(OH)D3 SERPL-MCNC: 35.2 NG/ML
ALBUMIN SERPL ELPH-MCNC: 4.5 G/DL
ALP BLD-CCNC: 94 U/L
ALT SERPL-CCNC: 22 U/L
ANION GAP SERPL CALC-SCNC: 10 MMOL/L
AST SERPL-CCNC: 19 U/L
BILIRUB SERPL-MCNC: 0.3 MG/DL
BUN SERPL-MCNC: 15 MG/DL
CALCIUM SERPL-MCNC: 9.3 MG/DL
CHLORIDE SERPL-SCNC: 105 MMOL/L
CHOLEST SERPL-MCNC: 224 MG/DL
CO2 SERPL-SCNC: 25 MMOL/L
CREAT SERPL-MCNC: 0.87 MG/DL
GLUCOSE SERPL-MCNC: 95 MG/DL
HDLC SERPL-MCNC: 46 MG/DL
LDLC SERPL CALC-MCNC: 154 MG/DL
NONHDLC SERPL-MCNC: 179 MG/DL
POTASSIUM SERPL-SCNC: 4 MMOL/L
PROT SERPL-MCNC: 6.7 G/DL
SODIUM SERPL-SCNC: 141 MMOL/L
TRIGL SERPL-MCNC: 122 MG/DL

## 2021-07-04 NOTE — ASSESSMENT
[FreeTextEntry1] : \par \par Low back pain:\par -Symptoms have resolved\par \par Dyslipidemia\par -Check fasting labs\par -He is adhering to a low-fat, low-cholesterol\par \par Vitamin D deficiency\par -Check vitamin D 25 [place]\par -He has lost about 5 pounds\par \par Intolerance to sugary foods\par -He states he avoids high sugar foods and states he has been asymptomatic\par \par HCM:\par \par CPE 2020\par \par Depression screenin2021 PHQ 2 score 0\par \par EK2020 \par \par Flu shot: advised 2020-he declined\par \par Tdap: advised R/B discussed VIS given.  Tdap given 2021\par \par Covid vaccine Pfizer 2021 and 2021\par \par HIV testing: offered 2020-he declined\par \par PSA: 0.61 2021\par \par F/U 6 months for CPE.  Fasting ordered today and he will have done at the lab\par

## 2021-07-04 NOTE — REVIEW OF SYSTEMS
[Negative] : ENT [Fever] : no fever [Chills] : no chills [Fatigue] : no fatigue [Chest Pain] : no chest pain [Palpitations] : no palpitations [Lower Ext Edema] : no lower extremity edema [Wheezing] : no wheezing [Shortness Of Breath] : no shortness of breath [Cough] : no cough [Dyspnea on Exertion] : not dyspnea on exertion [Abdominal Pain] : no abdominal pain [Nausea] : no nausea [Diarrhea] : no diarrhea [Vomiting] : no vomiting [Skin Rash] : no skin rash [Anxiety] : no anxiety [Depression] : no depression

## 2021-07-04 NOTE — HISTORY OF PRESENT ILLNESS
[de-identified] : Here for follow up\par \par He states he has been eating healthier since finding out his cholesterol and triglycerides were high\par \par He states he feels well and denies any new complaints

## 2021-11-30 ENCOUNTER — NON-APPOINTMENT (OUTPATIENT)
Age: 41
End: 2021-11-30

## 2021-11-30 ENCOUNTER — APPOINTMENT (OUTPATIENT)
Dept: INTERNAL MEDICINE | Facility: CLINIC | Age: 41
End: 2021-11-30
Payer: COMMERCIAL

## 2021-11-30 VITALS
OXYGEN SATURATION: 97 % | WEIGHT: 156 LBS | HEART RATE: 69 BPM | HEIGHT: 65 IN | BODY MASS INDEX: 25.99 KG/M2 | SYSTOLIC BLOOD PRESSURE: 112 MMHG | TEMPERATURE: 97.9 F | DIASTOLIC BLOOD PRESSURE: 78 MMHG | RESPIRATION RATE: 15 BRPM

## 2021-11-30 DIAGNOSIS — Z13.31 ENCOUNTER FOR SCREENING FOR DEPRESSION: ICD-10-CM

## 2021-11-30 DIAGNOSIS — Z00.00 ENCOUNTER FOR GENERAL ADULT MEDICAL EXAMINATION W/OUT ABNORMAL FINDINGS: ICD-10-CM

## 2021-11-30 PROCEDURE — 99213 OFFICE O/P EST LOW 20 MIN: CPT | Mod: 25

## 2021-11-30 PROCEDURE — 99396 PREV VISIT EST AGE 40-64: CPT | Mod: 25

## 2021-11-30 PROCEDURE — 93000 ELECTROCARDIOGRAM COMPLETE: CPT

## 2021-11-30 PROCEDURE — 36415 COLL VENOUS BLD VENIPUNCTURE: CPT

## 2021-11-30 PROCEDURE — 96127 BRIEF EMOTIONAL/BEHAV ASSMT: CPT

## 2021-12-04 PROBLEM — Z13.31 DEPRESSION SCREENING: Status: ACTIVE | Noted: 2020-12-23

## 2021-12-04 PROBLEM — Z00.00 ENCOUNTER FOR PREVENTIVE HEALTH EXAMINATION: Status: ACTIVE | Noted: 2018-03-12

## 2021-12-04 NOTE — HEALTH RISK ASSESSMENT
[Yes] : Yes [No] : In the past 12 months have you used drugs other than those required for medical reasons? No [0] : 2) Feeling down, depressed, or hopeless: Not at all (0) [PHQ-2 Negative - No further assessment needed] : PHQ-2 Negative - No further assessment needed [HIV test declined] : HIV test declined [] : No [de-identified] : occasionally [[id number]] : 0

## 2021-12-04 NOTE — PHYSICAL EXAM
[No Acute Distress] : no acute distress [Well Nourished] : well nourished [Well Developed] : well developed [Well-Appearing] : well-appearing [Normal Voice/Communication] : normal voice/communication [Normal Sclera/Conjunctiva] : normal sclera/conjunctiva [PERRL] : pupils equal round and reactive to light [Normal Oropharynx] : the oropharynx was normal [No JVD] : no jugular venous distention [No Lymphadenopathy] : no lymphadenopathy [Supple] : supple [Thyroid Normal, No Nodules] : the thyroid was normal and there were no nodules present [No Respiratory Distress] : no respiratory distress  [No Accessory Muscle Use] : no accessory muscle use [Clear to Auscultation] : lungs were clear to auscultation bilaterally [Normal Rate] : normal rate  [Regular Rhythm] : with a regular rhythm [Normal S1, S2] : normal S1 and S2 [No Murmur] : no murmur heard [No Edema] : there was no peripheral edema [No Extremity Clubbing/Cyanosis] : no extremity clubbing/cyanosis [Soft] : abdomen soft [Non Tender] : non-tender [Non-distended] : non-distended [No Masses] : no abdominal mass palpated [No HSM] : no HSM [Normal Bowel Sounds] : normal bowel sounds [No Rash] : no rash [No Focal Deficits] : no focal deficits [Normal Affect] : the affect was normal [Alert and Oriented x3] : oriented to person, place, and time [Normal Mood] : the mood was normal [Normal Insight/Judgement] : insight and judgment were intact [EOMI] : extraocular movements intact [Normal Outer Ear/Nose] : the outer ears and nose were normal in appearance [Normal TMs] : both tympanic membranes were normal [Normal Nasal Mucosa] : the nasal mucosa was normal [No Carotid Bruits] : no carotid bruits [No CVA Tenderness] : no CVA  tenderness [No Spinal Tenderness] : no spinal tenderness [No Joint Swelling] : no joint swelling [No Skin Lesions] : no skin lesions [de-identified] : No calf tenderness

## 2021-12-04 NOTE — REVIEW OF SYSTEMS
[Fever] : no fever [Chills] : no chills [Fatigue] : no fatigue [Recent Change In Weight] : ~T no recent weight change [Earache] : no earache [Nasal Discharge] : no nasal discharge [Sore Throat] : no sore throat [Chest Pain] : no chest pain [Palpitations] : no palpitations [Lower Ext Edema] : no lower extremity edema [Shortness Of Breath] : no shortness of breath [Wheezing] : no wheezing [Cough] : no cough [Dyspnea on Exertion] : not dyspnea on exertion [Abdominal Pain] : no abdominal pain [Nausea] : no nausea [Diarrhea] : no diarrhea [Vomiting] : no vomiting [Skin Rash] : no skin rash [Anxiety] : no anxiety [Depression] : no depression [Negative] : Musculoskeletal [de-identified] : See HPI

## 2021-12-04 NOTE — HISTORY OF PRESENT ILLNESS
[de-identified] : Here for CPE\par \par He states he has been trying to eat healthy\par \par He states lately he has been feeling a little dizzy.  He states he feels the dizziness might be related to his recent COVID-19 vaccines.  He states he received COVID-19 vaccine 4/2021, 5/2021, and booster 11/2021.  He states he received Pfizer vaccine all 3 times.  He states his dizziness seems to be worse with head movement.  He reports occasionally he feels a pulsating sensation in his right anterior neck and his right arm and his right leg.  He states sometimes he has a little bit of nausea in the mornings.  He denies headache, fever/chills, chest pain, shortness of breath, palpitations, fainting.  He denies nausea, vomiting, diarrhea.

## 2021-12-04 NOTE — ASSESSMENT
[FreeTextEntry1] : \par Dizziness/anterior neck pain/occasional nausea\par -Symptoms could be due to vertigo\par -EKG today NSR at 66 with no ST abnormalities\par -Check labs\par -Check ultrasound of neck and ultrasound of carotid arteries\par -will order MRI of brain\par -I have advised that he see cardiology for evaluation\par -If symptoms persist he may benefit from seeing neurology\par -I offered trial of meclizine but he declined as he states he prefers to not take any medications\par -He is to notify office if symptoms persist or worsen\par -If symptoms worsen or if he develops chest pain or shortness of breath he is to go to the emergency room\par \par Dyslipidemia\par -His last total cholesterol was 224\par -Check fasting labs\par -He should adhere to a low-fat and low-cholesterol diet\par \par Vitamin D deficiency\par -Vitamin D 25 OH was normal 2021\par \par \par HCM:\par \par CPE 2021\par \par Depression screenin2021 PHQ 2 score 0\par \par EK2021\par \par Flu shot: advised 2021-he declined\par \par Tdap: 2021\par \par Covid vaccine Pfizer x 3\par \par HIV testing: offered 2021-he declined\par \par PSA: 0.61 2021\par \par F/U 4 weeks.  Fasting labs drawn in office today\par

## 2021-12-05 LAB
ALBUMIN SERPL ELPH-MCNC: 4.7 G/DL
ALP BLD-CCNC: 102 U/L
ALT SERPL-CCNC: 26 U/L
ANION GAP SERPL CALC-SCNC: 13 MMOL/L
AST SERPL-CCNC: 24 U/L
BASOPHILS # BLD AUTO: 0.04 K/UL
BASOPHILS NFR BLD AUTO: 0.5 %
BILIRUB SERPL-MCNC: 0.2 MG/DL
BUN SERPL-MCNC: 12 MG/DL
CALCIUM SERPL-MCNC: 9.8 MG/DL
CHLORIDE SERPL-SCNC: 101 MMOL/L
CHOLEST SERPL-MCNC: 193 MG/DL
CO2 SERPL-SCNC: 26 MMOL/L
CREAT SERPL-MCNC: 0.84 MG/DL
EOSINOPHIL # BLD AUTO: 0.84 K/UL
EOSINOPHIL NFR BLD AUTO: 9.9 %
ERYTHROCYTE [SEDIMENTATION RATE] IN BLOOD BY WESTERGREN METHOD: 15 MM/HR
GLUCOSE SERPL-MCNC: 88 MG/DL
HCT VFR BLD CALC: 44.3 %
HDLC SERPL-MCNC: 43 MG/DL
HGB BLD-MCNC: 14.8 G/DL
IMM GRANULOCYTES NFR BLD AUTO: 0.4 %
LDLC SERPL CALC-MCNC: 133 MG/DL
LYMPHOCYTES # BLD AUTO: 1.08 K/UL
LYMPHOCYTES NFR BLD AUTO: 12.7 %
MAN DIFF?: NORMAL
MCHC RBC-ENTMCNC: 29.5 PG
MCHC RBC-ENTMCNC: 33.4 GM/DL
MCV RBC AUTO: 88.4 FL
MONOCYTES # BLD AUTO: 0.88 K/UL
MONOCYTES NFR BLD AUTO: 10.4 %
NEUTROPHILS # BLD AUTO: 5.61 K/UL
NEUTROPHILS NFR BLD AUTO: 66.1 %
NONHDLC SERPL-MCNC: 150 MG/DL
PLATELET # BLD AUTO: 225 K/UL
POTASSIUM SERPL-SCNC: 4.1 MMOL/L
PROT SERPL-MCNC: 7 G/DL
RBC # BLD: 5.01 M/UL
RBC # FLD: 12.7 %
SODIUM SERPL-SCNC: 139 MMOL/L
TRIGL SERPL-MCNC: 86 MG/DL
TSH SERPL-ACNC: 3.08 UIU/ML
VIT B12 SERPL-MCNC: 637 PG/ML
WBC # FLD AUTO: 8.48 K/UL

## 2021-12-08 ENCOUNTER — NON-APPOINTMENT (OUTPATIENT)
Age: 41
End: 2021-12-08

## 2021-12-09 ENCOUNTER — APPOINTMENT (OUTPATIENT)
Dept: CARDIOLOGY | Facility: CLINIC | Age: 41
End: 2021-12-09
Payer: COMMERCIAL

## 2021-12-09 ENCOUNTER — NON-APPOINTMENT (OUTPATIENT)
Age: 41
End: 2021-12-09

## 2021-12-09 VITALS
SYSTOLIC BLOOD PRESSURE: 122 MMHG | HEART RATE: 66 BPM | WEIGHT: 156 LBS | DIASTOLIC BLOOD PRESSURE: 79 MMHG | BODY MASS INDEX: 25.99 KG/M2 | HEIGHT: 65 IN | OXYGEN SATURATION: 100 %

## 2021-12-09 DIAGNOSIS — M54.2 CERVICALGIA: ICD-10-CM

## 2021-12-09 PROCEDURE — 93000 ELECTROCARDIOGRAM COMPLETE: CPT

## 2021-12-09 PROCEDURE — 99214 OFFICE O/P EST MOD 30 MIN: CPT

## 2021-12-09 NOTE — HISTORY OF PRESENT ILLNESS
[FreeTextEntry1] : 42 yo male presents for cardiac evaluation. He complains of chest discomfort, both sharp and dull with exertion and at rest. He reports occasional palpitations. He deniies syncope or near syncope. He has no history of coronary disease. 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 He attributes some symptoms to the booster of Pfizer vaccine which he received 2 weeks ago. He does not use tobacco. Pt was found to have elevated lipids and his PMD suggested dietary intervention. Pt reports dietary adherence and low fat intake. Subsequent measurements were improved.

## 2021-12-09 NOTE — DISCUSSION/SUMMARY
[Possible Cardiac Ischemia (Intermd Prob)] : possible cardiac ischemia (intermediate probability) [Paroxysmal SVT] : paroxysmal supraventricular tachycardia [Patient] : the patient [FreeTextEntry1] : ECG was done to evaluate arrhythmia. Zio was ordered to evaluate for arrhythmia. Echo to evaluate LVF and valves, carotid doppler to evaluate neck discomfort in the presence of elevated lipids. Pt will follow up in 6 weeks to 2 months.

## 2021-12-21 ENCOUNTER — OUTPATIENT (OUTPATIENT)
Dept: OUTPATIENT SERVICES | Facility: HOSPITAL | Age: 41
LOS: 1 days | End: 2021-12-21
Payer: COMMERCIAL

## 2021-12-21 ENCOUNTER — APPOINTMENT (OUTPATIENT)
Dept: MRI IMAGING | Facility: CLINIC | Age: 41
End: 2021-12-21
Payer: COMMERCIAL

## 2021-12-21 ENCOUNTER — APPOINTMENT (OUTPATIENT)
Dept: ULTRASOUND IMAGING | Facility: CLINIC | Age: 41
End: 2021-12-21
Payer: COMMERCIAL

## 2021-12-21 DIAGNOSIS — Z00.8 ENCOUNTER FOR OTHER GENERAL EXAMINATION: ICD-10-CM

## 2021-12-21 DIAGNOSIS — M54.2 CERVICALGIA: ICD-10-CM

## 2021-12-21 DIAGNOSIS — R42 DIZZINESS AND GIDDINESS: ICD-10-CM

## 2021-12-21 PROCEDURE — 70553 MRI BRAIN STEM W/O & W/DYE: CPT | Mod: 26

## 2021-12-21 PROCEDURE — 93880 EXTRACRANIAL BILAT STUDY: CPT

## 2021-12-21 PROCEDURE — 76536 US EXAM OF HEAD AND NECK: CPT

## 2021-12-21 PROCEDURE — 93880 EXTRACRANIAL BILAT STUDY: CPT | Mod: 26

## 2021-12-21 PROCEDURE — 76536 US EXAM OF HEAD AND NECK: CPT | Mod: 26

## 2021-12-21 PROCEDURE — A9585: CPT

## 2021-12-21 PROCEDURE — 70553 MRI BRAIN STEM W/O & W/DYE: CPT

## 2021-12-28 ENCOUNTER — NON-APPOINTMENT (OUTPATIENT)
Age: 41
End: 2021-12-28

## 2021-12-30 ENCOUNTER — APPOINTMENT (OUTPATIENT)
Dept: INTERNAL MEDICINE | Facility: CLINIC | Age: 41
End: 2021-12-30
Payer: COMMERCIAL

## 2021-12-30 VITALS
SYSTOLIC BLOOD PRESSURE: 122 MMHG | BODY MASS INDEX: 26.66 KG/M2 | HEART RATE: 75 BPM | WEIGHT: 160 LBS | OXYGEN SATURATION: 97 % | DIASTOLIC BLOOD PRESSURE: 74 MMHG | HEIGHT: 65 IN | RESPIRATION RATE: 16 BRPM | TEMPERATURE: 98.1 F

## 2021-12-30 DIAGNOSIS — R42 DIZZINESS AND GIDDINESS: ICD-10-CM

## 2021-12-30 PROCEDURE — 99213 OFFICE O/P EST LOW 20 MIN: CPT

## 2021-12-30 NOTE — PHYSICAL EXAM
[No Acute Distress] : no acute distress [Well Nourished] : well nourished [Well Developed] : well developed [Well-Appearing] : well-appearing [Normal Voice/Communication] : normal voice/communication [Normal Sclera/Conjunctiva] : normal sclera/conjunctiva [PERRL] : pupils equal round and reactive to light [Normal Oropharynx] : the oropharynx was normal [No JVD] : no jugular venous distention [No Lymphadenopathy] : no lymphadenopathy [Supple] : supple [Thyroid Normal, No Nodules] : the thyroid was normal and there were no nodules present [No Respiratory Distress] : no respiratory distress  [No Accessory Muscle Use] : no accessory muscle use [Clear to Auscultation] : lungs were clear to auscultation bilaterally [Normal Rate] : normal rate  [Regular Rhythm] : with a regular rhythm [Normal S1, S2] : normal S1 and S2 [No Murmur] : no murmur heard [No Edema] : there was no peripheral edema [No Extremity Clubbing/Cyanosis] : no extremity clubbing/cyanosis [No Rash] : no rash [Normal Affect] : the affect was normal [Alert and Oriented x3] : oriented to person, place, and time [Normal Mood] : the mood was normal [Normal Insight/Judgement] : insight and judgment were intact

## 2021-12-30 NOTE — ASSESSMENT
[FreeTextEntry1] : \par Dizziness\par -sx have resolved\par -carotid US was normal\par -he was seen by cardiology and lynneo patch and ECHO ordered\par - MRI of brain showed mild nonspecific white matter abnormalities-discussed with pt\par -he is to notify office if sx recur\par -he denies chest pain, sob, palpitations\par \par Dyslipidemia\par -He should adhere to a low-fat and low-cholesterol diet\par -most recent cholesterol 2021 was normal at 193\par \par Vitamin D deficiency\par -Vitamin D 25 OH was normal 2021\par \par Cervical adenopathy:\par -US shows pro,inet left sided lymph node-may be due to covid vaccine-got vaccine on same side\par -advised repeat neck US in 3 weeks\par -no LAD noted on exam today\par -he feels well\par \par \par HCM:\par \par CPE 2021\par \par Depression screenin2021 PHQ 2 score 0\par \par EK2021\par \par Flu shot: advised 2021-he declined\par \par Tdap: 2021\par \par Covid vaccine Pfizer x 3\par \par HIV testing: offered 2021-he declined\par \par PSA: 0.61 2021\par \par F/U 3 months.  he will finish work up with cardiologist\par

## 2021-12-30 NOTE — HISTORY OF PRESENT ILLNESS
[de-identified] : Here for follow up\par \par He states he feels well\par \par He states previously reported palpitatiosn and dizziness has resolved.  he thinks it may have just been the covid vaccine booster

## 2021-12-30 NOTE — REVIEW OF SYSTEMS
[Negative] : Neurological [Fever] : no fever [Chills] : no chills [Fatigue] : no fatigue [Chest Pain] : no chest pain [Palpitations] : no palpitations [Lower Ext Edema] : no lower extremity edema [Shortness Of Breath] : no shortness of breath [Wheezing] : no wheezing [Cough] : no cough [Dyspnea on Exertion] : not dyspnea on exertion [Abdominal Pain] : no abdominal pain [Nausea] : no nausea [Diarrhea] : no diarrhea [Vomiting] : no vomiting

## 2022-01-04 ENCOUNTER — APPOINTMENT (OUTPATIENT)
Dept: CARDIOLOGY | Facility: CLINIC | Age: 42
End: 2022-01-04

## 2022-01-14 ENCOUNTER — APPOINTMENT (OUTPATIENT)
Dept: CARDIOLOGY | Facility: CLINIC | Age: 42
End: 2022-01-14

## 2022-01-18 ENCOUNTER — APPOINTMENT (OUTPATIENT)
Dept: CARDIOLOGY | Facility: CLINIC | Age: 42
End: 2022-01-18
Payer: COMMERCIAL

## 2022-01-18 PROCEDURE — 93306 TTE W/DOPPLER COMPLETE: CPT

## 2022-01-19 ENCOUNTER — APPOINTMENT (OUTPATIENT)
Dept: CARDIOLOGY | Facility: CLINIC | Age: 42
End: 2022-01-19
Payer: COMMERCIAL

## 2022-01-19 PROCEDURE — 93015 CV STRESS TEST SUPVJ I&R: CPT

## 2022-02-11 ENCOUNTER — APPOINTMENT (OUTPATIENT)
Dept: CARDIOLOGY | Facility: CLINIC | Age: 42
End: 2022-02-11
Payer: COMMERCIAL

## 2022-02-11 VITALS
HEIGHT: 65 IN | BODY MASS INDEX: 26.66 KG/M2 | DIASTOLIC BLOOD PRESSURE: 75 MMHG | HEART RATE: 66 BPM | WEIGHT: 160 LBS | SYSTOLIC BLOOD PRESSURE: 128 MMHG | OXYGEN SATURATION: 97 %

## 2022-02-11 PROCEDURE — 99214 OFFICE O/P EST MOD 30 MIN: CPT

## 2022-02-11 PROCEDURE — 93000 ELECTROCARDIOGRAM COMPLETE: CPT

## 2022-02-11 NOTE — DISCUSSION/SUMMARY
[Possible Cardiac Ischemia (Intermd Prob)] : possible cardiac ischemia (intermediate probability) [Paroxysmal SVT] : paroxysmal supraventricular tachycardia [Patient] : the patient [FreeTextEntry1] : ECG was done to evaluate arrhythmia. Echo and ETT were reviewed. Pt declines Zio. Pt is asymptomatic and will follow prn.

## 2022-02-11 NOTE — HISTORY OF PRESENT ILLNESS
[FreeTextEntry1] : 40 yo male presents for cardiac evaluation. He complained of chest discomfort, both sharp and dull with exertion and at rest. He reported occasional palpitations. He deniies syncope or near syncope. He has no history of coronary disease. Testing was performed. Pt denies further symptoms at this time.

## 2022-03-29 ENCOUNTER — APPOINTMENT (OUTPATIENT)
Dept: INTERNAL MEDICINE | Facility: CLINIC | Age: 42
End: 2022-03-29
Payer: COMMERCIAL

## 2022-03-29 VITALS
WEIGHT: 157 LBS | RESPIRATION RATE: 15 BRPM | OXYGEN SATURATION: 98 % | HEIGHT: 65 IN | SYSTOLIC BLOOD PRESSURE: 110 MMHG | BODY MASS INDEX: 26.16 KG/M2 | HEART RATE: 65 BPM | TEMPERATURE: 98.2 F | DIASTOLIC BLOOD PRESSURE: 78 MMHG

## 2022-03-29 DIAGNOSIS — M23.8X2 OTHER INTERNAL DERANGEMENTS OF LEFT KNEE: ICD-10-CM

## 2022-03-29 PROCEDURE — 99213 OFFICE O/P EST LOW 20 MIN: CPT

## 2022-03-29 NOTE — ASSESSMENT
[FreeTextEntry1] : \par Mild crepitus of the left knee joint\par -He denies any left knee pain or swelling\par -As he has no pain or swelling no intervention advised at this time\par -I advised he monitor symptoms and he should notify office if he develops pain, swelling, decreased range of motion\par \par Dizziness\par -sx have resolved\par -carotid US was normal\par - MRI of brain showed mild nonspecific white matter abnormalities-\par -he had cardiac work-up including exercise stress test and echo which appeared to be normal.  Appears that the Zio patch was advised but he declined\par -He is to notify office or seek medical attention if symptoms recur\par \par Dyslipidemia\par -He states recently his diet has not been great\par -He should adhere to a low-fat and low-cholesterol diet\par -He requests lab order to repeat fasting lipids\par \par Vitamin D deficiency\par -Vitamin D 25 OH was normal 2021\par \par Cervical adenopathy:\par -US shows pro,inet left sided lymph node-may be due to covid vaccine-got vaccine on same side\par -advised repeat neck US in 3 weeks.  This was ordered previously but he did not go get study done.  I did advise that he have follow-up neck ultrasound done to make sure lymphadenopathy has resolved.  Ultrasound of neck was ordered again today\par -no LAD noted on exam today\par \par \par \par HCM:\par \par CPE 2021\par \par Depression screenin2021 PHQ 2 score 0\par \par EK2021\par \par Flu shot: advised 2021-he declined\par \par Tdap: 2021\par \par Covid vaccine Pfizer x 3\par \par HIV testing: offered 2021-he declined\par \par PSA: 0.61 2021-PSA ordered today\par \par F/U 6 months for CPE.  He will have fasting blood work done at the lab\par

## 2022-03-29 NOTE — REVIEW OF SYSTEMS
[Fever] : no fever [Chills] : no chills [Fatigue] : no fatigue [Chest Pain] : no chest pain [Palpitations] : no palpitations [Lower Ext Edema] : no lower extremity edema [Shortness Of Breath] : no shortness of breath [Wheezing] : no wheezing [Cough] : no cough [Dyspnea on Exertion] : not dyspnea on exertion [Abdominal Pain] : no abdominal pain [Nausea] : no nausea [Diarrhea] : no diarrhea [Vomiting] : no vomiting [Joint Pain] : no joint pain [Negative] : Psychiatric

## 2022-03-29 NOTE — HISTORY OF PRESENT ILLNESS
[de-identified] : Here today for follow-up of hyperlipidemia\par \par He requests lab order to have his cholesterol numbers repeated\par \par He states he never went for neck ultrasound that was ordered previously for follow-up of cervical lymphadenopathy.  He states he currently does not feel any lumps in his neck.  He thinks this was likely due to the Covid vaccine\par \par He states he has had no recurrence of his palpitations or dizziness.  He states he was seen by cardiologist and had testing done including stress test which she states were normal\par \par He also reports that sometimes when he bends his left knee he hears a cracking sound.  He denies any knee pain.  He denies decreased range of motion of left knee.  He denies any swelling.  He denies trauma.

## 2022-03-29 NOTE — PHYSICAL EXAM
[No Acute Distress] : no acute distress [Well Nourished] : well nourished [Well Developed] : well developed [Well-Appearing] : well-appearing [Normal Voice/Communication] : normal voice/communication [Normal Sclera/Conjunctiva] : normal sclera/conjunctiva [PERRL] : pupils equal round and reactive to light [Normal Oropharynx] : the oropharynx was normal [No JVD] : no jugular venous distention [No Lymphadenopathy] : no lymphadenopathy [Supple] : supple [Thyroid Normal, No Nodules] : the thyroid was normal and there were no nodules present [No Respiratory Distress] : no respiratory distress  [No Accessory Muscle Use] : no accessory muscle use [Clear to Auscultation] : lungs were clear to auscultation bilaterally [Normal Rate] : normal rate  [Regular Rhythm] : with a regular rhythm [Normal S1, S2] : normal S1 and S2 [No Murmur] : no murmur heard [No Edema] : there was no peripheral edema [No Extremity Clubbing/Cyanosis] : no extremity clubbing/cyanosis [No Rash] : no rash [Normal Affect] : the affect was normal [Alert and Oriented x3] : oriented to person, place, and time [Normal Mood] : the mood was normal [Normal Insight/Judgement] : insight and judgment were intact [de-identified] : Left knee with full range of motion, no tenderness, no swelling

## 2022-03-30 ENCOUNTER — TRANSCRIPTION ENCOUNTER (OUTPATIENT)
Age: 42
End: 2022-03-30

## 2022-03-31 NOTE — ED STATDOCS - PSH
Discharge instructions given with patient verbalizing the understand of. No significant past surgical history

## 2022-04-06 LAB
ALBUMIN SERPL ELPH-MCNC: 4.9 G/DL
ALP BLD-CCNC: 98 U/L
ALT SERPL-CCNC: 14 U/L
ANION GAP SERPL CALC-SCNC: 12 MMOL/L
AST SERPL-CCNC: 19 U/L
BILIRUB SERPL-MCNC: 0.3 MG/DL
BUN SERPL-MCNC: 15 MG/DL
CALCIUM SERPL-MCNC: 9.8 MG/DL
CHLORIDE SERPL-SCNC: 105 MMOL/L
CHOLEST SERPL-MCNC: 195 MG/DL
CO2 SERPL-SCNC: 26 MMOL/L
CREAT SERPL-MCNC: 0.92 MG/DL
EGFR: 107 ML/MIN/1.73M2
GLUCOSE SERPL-MCNC: 89 MG/DL
HDLC SERPL-MCNC: 41 MG/DL
LDLC SERPL CALC-MCNC: 136 MG/DL
NONHDLC SERPL-MCNC: 155 MG/DL
POTASSIUM SERPL-SCNC: 4.3 MMOL/L
PROT SERPL-MCNC: 6.5 G/DL
PSA SERPL-MCNC: 0.54 NG/ML
SODIUM SERPL-SCNC: 142 MMOL/L
TRIGL SERPL-MCNC: 93 MG/DL
TSH SERPL-ACNC: 2.05 UIU/ML

## 2022-04-07 ENCOUNTER — APPOINTMENT (OUTPATIENT)
Dept: ULTRASOUND IMAGING | Facility: CLINIC | Age: 42
End: 2022-04-07
Payer: COMMERCIAL

## 2022-04-07 ENCOUNTER — OUTPATIENT (OUTPATIENT)
Dept: OUTPATIENT SERVICES | Facility: HOSPITAL | Age: 42
LOS: 1 days | End: 2022-04-07
Payer: COMMERCIAL

## 2022-04-07 ENCOUNTER — TRANSCRIPTION ENCOUNTER (OUTPATIENT)
Age: 42
End: 2022-04-07

## 2022-04-07 DIAGNOSIS — R59.0 LOCALIZED ENLARGED LYMPH NODES: ICD-10-CM

## 2022-04-07 PROCEDURE — 76536 US EXAM OF HEAD AND NECK: CPT

## 2022-04-07 PROCEDURE — 76536 US EXAM OF HEAD AND NECK: CPT | Mod: 26

## 2022-04-20 ENCOUNTER — NON-APPOINTMENT (OUTPATIENT)
Age: 42
End: 2022-04-20

## 2022-06-13 NOTE — ED PROVIDER NOTE - NS ED MD DISPO DISCHARGE CCDA
Message sent to pharmacy - Refusal reason: Duplicate (ORDER SENT TODAY BY SAFIA CHILD).  Sushma MORE      
Patient/Caregiver provided printed discharge information.

## 2022-09-13 ENCOUNTER — APPOINTMENT (OUTPATIENT)
Dept: ULTRASOUND IMAGING | Facility: CLINIC | Age: 42
End: 2022-09-13

## 2022-09-13 ENCOUNTER — OUTPATIENT (OUTPATIENT)
Dept: OUTPATIENT SERVICES | Facility: HOSPITAL | Age: 42
LOS: 1 days | End: 2022-09-13
Payer: COMMERCIAL

## 2022-09-13 DIAGNOSIS — R59.0 LOCALIZED ENLARGED LYMPH NODES: ICD-10-CM

## 2022-09-13 PROCEDURE — 76536 US EXAM OF HEAD AND NECK: CPT

## 2022-09-13 PROCEDURE — 76536 US EXAM OF HEAD AND NECK: CPT | Mod: 26

## 2022-09-20 ENCOUNTER — APPOINTMENT (OUTPATIENT)
Dept: INTERNAL MEDICINE | Facility: CLINIC | Age: 42
End: 2022-09-20

## 2022-09-20 VITALS
OXYGEN SATURATION: 98 % | BODY MASS INDEX: 26.66 KG/M2 | WEIGHT: 160 LBS | RESPIRATION RATE: 15 BRPM | HEART RATE: 70 BPM | TEMPERATURE: 97.5 F | HEIGHT: 65 IN | DIASTOLIC BLOOD PRESSURE: 80 MMHG | SYSTOLIC BLOOD PRESSURE: 116 MMHG

## 2022-09-20 DIAGNOSIS — E55.9 VITAMIN D DEFICIENCY, UNSPECIFIED: ICD-10-CM

## 2022-09-20 DIAGNOSIS — R59.0 LOCALIZED ENLARGED LYMPH NODES: ICD-10-CM

## 2022-09-20 DIAGNOSIS — J30.2 OTHER SEASONAL ALLERGIC RHINITIS: ICD-10-CM

## 2022-09-20 DIAGNOSIS — L98.9 DISORDER OF THE SKIN AND SUBCUTANEOUS TISSUE, UNSPECIFIED: ICD-10-CM

## 2022-09-20 DIAGNOSIS — E78.5 HYPERLIPIDEMIA, UNSPECIFIED: ICD-10-CM

## 2022-09-20 PROCEDURE — 96127 BRIEF EMOTIONAL/BEHAV ASSMT: CPT

## 2022-09-20 PROCEDURE — 99214 OFFICE O/P EST MOD 30 MIN: CPT | Mod: 25

## 2022-09-20 RX ORDER — CETIRIZINE HYDROCHLORIDE 10 MG/1
10 TABLET, FILM COATED ORAL
Qty: 1 | Refills: 1 | Status: ACTIVE | COMMUNITY
Start: 2022-09-20

## 2022-09-20 RX ORDER — TRIAMCINOLONE ACETONIDE 1 MG/G
0.1 CREAM TOPICAL
Qty: 1 | Refills: 0 | Status: ACTIVE | COMMUNITY
Start: 2022-09-20 | End: 1900-01-01

## 2022-09-21 ENCOUNTER — TRANSCRIPTION ENCOUNTER (OUTPATIENT)
Age: 42
End: 2022-09-21

## 2022-09-24 PROBLEM — E55.9 VITAMIN D INSUFFICIENCY: Status: ACTIVE | Noted: 2021-06-29

## 2022-09-24 NOTE — PHYSICAL EXAM
[No Acute Distress] : no acute distress [Well Nourished] : well nourished [Well Developed] : well developed [Well-Appearing] : well-appearing [Normal Voice/Communication] : normal voice/communication [Normal Sclera/Conjunctiva] : normal sclera/conjunctiva [PERRL] : pupils equal round and reactive to light [Normal Oropharynx] : the oropharynx was normal [No JVD] : no jugular venous distention [No Lymphadenopathy] : no lymphadenopathy [Supple] : supple [Thyroid Normal, No Nodules] : the thyroid was normal and there were no nodules present [No Respiratory Distress] : no respiratory distress  [No Accessory Muscle Use] : no accessory muscle use [Clear to Auscultation] : lungs were clear to auscultation bilaterally [Normal Rate] : normal rate  [Regular Rhythm] : with a regular rhythm [Normal S1, S2] : normal S1 and S2 [No Murmur] : no murmur heard [No Edema] : there was no peripheral edema [No Extremity Clubbing/Cyanosis] : no extremity clubbing/cyanosis [Normal Affect] : the affect was normal [Alert and Oriented x3] : oriented to person, place, and time [Normal Mood] : the mood was normal [Normal Insight/Judgement] : insight and judgment were intact [Normal Outer Ear/Nose] : the outer ears and nose were normal in appearance [Normal TMs] : both tympanic membranes were normal [Soft] : abdomen soft [Non Tender] : non-tender [Non-distended] : non-distended [No Masses] : no abdominal mass palpated [No HSM] : no HSM [Normal Bowel Sounds] : normal bowel sounds [No Focal Deficits] : no focal deficits [de-identified] : Nasal mucosa was edematous with pale turbinates [de-identified] : On bilateral hands over scattered knuckles he has circular erythematous slightly raised skin lesions

## 2022-09-24 NOTE — HEALTH RISK ASSESSMENT
[0] : 2) Feeling down, depressed, or hopeless: Not at all (0) [PHQ-2 Negative - No further assessment needed] : PHQ-2 Negative - No further assessment needed [[id number]] : 0

## 2022-09-24 NOTE — HISTORY OF PRESENT ILLNESS
[de-identified] : Here for f/u of cervical LAD Crescencio ultrasound of the neck 9/2020 which was normal\par \par He states today he develops some allergies.  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  No fever/chills, cough.  he denies sick contacts.  He states he usually uses Zyrtec which she started today and this usually helps\par \par He states he would like to have his lipids checked again\par \par He reports he does have some itchy skin lesions on his bilateral hands over his knuckles.  He attributes this to wearing gloves at work.  He states he has tried to file them down with his wife's nail file.  He states he feels this has made it worse.

## 2022-09-24 NOTE — REVIEW OF SYSTEMS
[Negative] : Musculoskeletal [Fever] : no fever [Chills] : no chills [Fatigue] : no fatigue [Chest Pain] : no chest pain [Palpitations] : no palpitations [Lower Ext Edema] : no lower extremity edema [Shortness Of Breath] : no shortness of breath [Wheezing] : no wheezing [Cough] : no cough [Dyspnea on Exertion] : not dyspnea on exertion [Abdominal Pain] : no abdominal pain [Nausea] : no nausea [Diarrhea] : no diarrhea [Vomiting] : no vomiting [FreeTextEntry4] : see HPI [de-identified] : see HPI

## 2022-09-24 NOTE — ASSESSMENT
[FreeTextEntry1] : \par Skin lesions of bilateral hands\par -Could possibly be due to granuloma annulare\par -I have advised he consider trying a different type of gloves to use at work\par -I have advised that he avoid traumatizing or scratching skin lesions as this may be worsening symptoms\par -I have advised trial of triamcinolone 0.1% cream twice daily as needed\par -I have advised that he see dermatology for further evaluation\par \par Seasonal allergies\par -He will continue to use Zyrtec as needed\par -He is to notify office if symptoms persist or worsen\par \par Previously reported dizziness\par -No further symptoms reported\par \par Dyslipidemia\par -Check fasting labs\par -He should adhere to a low-fat and low-cholesterol diet\par \par Vitamin D deficiency\par -checkVitamin D 25 [place] \par \par Cervical adenopathy:\par -Symptoms have resolved\par -His ultrasound of neck 2022 was normal\par \par \par \par HCM:\par \par CPE 2021\par \par Depression screenin2022 PHQ 2 score 0\par \par EK2021\par \par Flu shot: advised 2022-he declined\par \par Tdap: 2021\par \par Covid vaccine Pfizer x 3\par \par HIV testing: offered 2021-he declined\par \par PSA: 0.54 2022\par \par F/U 6 months for CPE.  Fasting labs ordered which she will have done at the lab

## 2022-10-16 LAB
25(OH)D3 SERPL-MCNC: 28.4 NG/ML
ALBUMIN SERPL ELPH-MCNC: 4.7 G/DL
ALP BLD-CCNC: 103 U/L
ALT SERPL-CCNC: 18 U/L
ANION GAP SERPL CALC-SCNC: 10 MMOL/L
AST SERPL-CCNC: 19 U/L
BASOPHILS # BLD AUTO: 0.07 K/UL
BASOPHILS NFR BLD AUTO: 0.9 %
BILIRUB SERPL-MCNC: 0.4 MG/DL
BUN SERPL-MCNC: 15 MG/DL
CALCIUM SERPL-MCNC: 9.8 MG/DL
CHLORIDE SERPL-SCNC: 105 MMOL/L
CHOLEST SERPL-MCNC: 215 MG/DL
CO2 SERPL-SCNC: 27 MMOL/L
CREAT SERPL-MCNC: 0.93 MG/DL
EGFR: 104 ML/MIN/1.73M2
EOSINOPHIL # BLD AUTO: 1.15 K/UL
EOSINOPHIL NFR BLD AUTO: 14.4 %
GLUCOSE SERPL-MCNC: 89 MG/DL
HCT VFR BLD CALC: 48.6 %
HDLC SERPL-MCNC: 43 MG/DL
HGB BLD-MCNC: 15.9 G/DL
IMM GRANULOCYTES NFR BLD AUTO: 0.3 %
LDLC SERPL CALC-MCNC: 156 MG/DL
LYMPHOCYTES # BLD AUTO: 2.57 K/UL
LYMPHOCYTES NFR BLD AUTO: 32.2 %
MAN DIFF?: NORMAL
MCHC RBC-ENTMCNC: 29.2 PG
MCHC RBC-ENTMCNC: 32.7 GM/DL
MCV RBC AUTO: 89.3 FL
MONOCYTES # BLD AUTO: 0.71 K/UL
MONOCYTES NFR BLD AUTO: 8.9 %
NEUTROPHILS # BLD AUTO: 3.45 K/UL
NEUTROPHILS NFR BLD AUTO: 43.3 %
NONHDLC SERPL-MCNC: 173 MG/DL
PLATELET # BLD AUTO: 247 K/UL
POTASSIUM SERPL-SCNC: 5 MMOL/L
PROT SERPL-MCNC: 7.1 G/DL
RBC # BLD: 5.44 M/UL
RBC # FLD: 12.8 %
SODIUM SERPL-SCNC: 141 MMOL/L
TRIGL SERPL-MCNC: 83 MG/DL
WBC # FLD AUTO: 7.97 K/UL

## 2022-10-20 ENCOUNTER — TRANSCRIPTION ENCOUNTER (OUTPATIENT)
Age: 42
End: 2022-10-20

## 2023-03-16 NOTE — ED ADULT NURSE NOTE - CHPI ED NUR DURATION
today Doxepin Counseling:  Patient advised that the medication is sedating and not to drive a car after taking this medication. Patient informed of potential adverse effects including but not limited to dry mouth, urinary retention, and blurry vision.  The patient verbalized understanding of the proper use and possible adverse effects of doxepin.  All of the patient's questions and concerns were addressed.

## 2023-04-04 ENCOUNTER — APPOINTMENT (OUTPATIENT)
Dept: INTERNAL MEDICINE | Facility: CLINIC | Age: 43
End: 2023-04-04

## 2023-10-11 ENCOUNTER — NON-APPOINTMENT (OUTPATIENT)
Age: 43
End: 2023-10-11

## 2023-12-30 ENCOUNTER — NON-APPOINTMENT (OUTPATIENT)
Age: 43
End: 2023-12-30

## 2024-05-16 NOTE — ED ADULT NURSE NOTE - NEURO WDL
What Type Of Note Output Would You Prefer (Optional)?: Standard Output How Severe Is Your Skin Lesion?: moderate Has Your Skin Lesion Been Treated?: not been treated Is This A New Presentation, Or A Follow-Up?: Skin Lesion Alert and oriented to person, place and time, memory intact, behavior appropriate to situation, PERRL.

## 2024-08-07 ENCOUNTER — NON-APPOINTMENT (OUTPATIENT)
Age: 44
End: 2024-08-07

## 2024-10-17 ENCOUNTER — NON-APPOINTMENT (OUTPATIENT)
Age: 44
End: 2024-10-17

## 2025-05-22 ENCOUNTER — APPOINTMENT (OUTPATIENT)
Dept: INTERNAL MEDICINE | Facility: CLINIC | Age: 45
End: 2025-05-22